# Patient Record
Sex: FEMALE | Race: BLACK OR AFRICAN AMERICAN | NOT HISPANIC OR LATINO | Employment: OTHER | URBAN - METROPOLITAN AREA
[De-identification: names, ages, dates, MRNs, and addresses within clinical notes are randomized per-mention and may not be internally consistent; named-entity substitution may affect disease eponyms.]

---

## 2019-12-02 ENCOUNTER — OFFICE VISIT (OUTPATIENT)
Dept: UROLOGY | Facility: CLINIC | Age: 69
End: 2019-12-02
Payer: MEDICARE

## 2019-12-02 VITALS
HEIGHT: 63 IN | BODY MASS INDEX: 37.21 KG/M2 | SYSTOLIC BLOOD PRESSURE: 137 MMHG | RESPIRATION RATE: 17 BRPM | HEART RATE: 46 BPM | WEIGHT: 210 LBS | DIASTOLIC BLOOD PRESSURE: 65 MMHG

## 2019-12-02 DIAGNOSIS — R39.13 INTERMITTENT URINARY STREAM: ICD-10-CM

## 2019-12-02 DIAGNOSIS — N39.41 URGE URINARY INCONTINENCE: Primary | ICD-10-CM

## 2019-12-02 LAB
BILIRUB UR QL STRIP: NEGATIVE
GLUCOSE UR QL STRIP: NEGATIVE
KETONES UR QL STRIP: NEGATIVE
LEUKOCYTE ESTERASE UR QL STRIP: NEGATIVE
PH, POC UA: 7
POC AMORP, URINE: ABNORMAL
POC BACTI, URINE: ABNORMAL
POC BLOOD, URINE: POSITIVE
POC CASTS, URINE: ABNORMAL
POC CRYST, URINE: ABNORMAL
POC EPITH, URINE: ABNORMAL
POC HCG, URINE: ABNORMAL
POC HYALIN, URINE: 0 LPF
POC MUCUS, URINE: ABNORMAL
POC NITRATES, URINE: NEGATIVE
POC OTHER, URINE: ABNORMAL
POC RBC, URINE: 0 HPF
POC WBC, URINE: 0 HPF
PROT UR QL STRIP: NEGATIVE
SP GR UR STRIP: 1.01 (ref 1–1.03)
UROBILINOGEN UR STRIP-ACNC: 1 (ref 0.1–1.1)

## 2019-12-02 PROCEDURE — 1159F PR MEDICATION LIST DOCUMENTED IN MEDICAL RECORD: ICD-10-PCS | Mod: S$GLB,,, | Performed by: NURSE PRACTITIONER

## 2019-12-02 PROCEDURE — 1126F PR PAIN SEVERITY QUANTIFIED, NO PAIN PRESENT: ICD-10-PCS | Mod: S$GLB,,, | Performed by: NURSE PRACTITIONER

## 2019-12-02 PROCEDURE — 81001 PR  URINALYSIS, AUTO, W/SCOPE: ICD-10-PCS | Mod: S$GLB,,, | Performed by: NURSE PRACTITIONER

## 2019-12-02 PROCEDURE — 81001 URINALYSIS AUTO W/SCOPE: CPT | Mod: S$GLB,,, | Performed by: NURSE PRACTITIONER

## 2019-12-02 PROCEDURE — 99214 OFFICE O/P EST MOD 30 MIN: CPT | Mod: 25,S$GLB,, | Performed by: NURSE PRACTITIONER

## 2019-12-02 PROCEDURE — 1159F MED LIST DOCD IN RCRD: CPT | Mod: S$GLB,,, | Performed by: NURSE PRACTITIONER

## 2019-12-02 PROCEDURE — 99214 PR OFFICE/OUTPT VISIT, EST, LEVL IV, 30-39 MIN: ICD-10-PCS | Mod: 25,S$GLB,, | Performed by: NURSE PRACTITIONER

## 2019-12-02 PROCEDURE — 1126F AMNT PAIN NOTED NONE PRSNT: CPT | Mod: S$GLB,,, | Performed by: NURSE PRACTITIONER

## 2019-12-02 RX ORDER — PRAVASTATIN SODIUM 40 MG/1
40 TABLET ORAL NIGHTLY
Refills: 3 | COMMUNITY
Start: 2019-11-22

## 2019-12-02 RX ORDER — IMIPRAMINE HYDROCHLORIDE 25 MG/1
TABLET, FILM COATED ORAL
COMMUNITY

## 2019-12-02 RX ORDER — ACETAMINOPHEN 500 MG
TABLET ORAL
COMMUNITY

## 2019-12-02 RX ORDER — TOPIRAMATE 100 MG/1
100 TABLET, FILM COATED ORAL 2 TIMES DAILY
Refills: 3 | COMMUNITY
Start: 2019-11-02

## 2019-12-02 RX ORDER — LOSARTAN POTASSIUM 50 MG/1
50 TABLET ORAL DAILY
Refills: 2 | COMMUNITY
Start: 2019-09-16

## 2019-12-02 RX ORDER — FLUTICASONE PROPIONATE 50 MCG
1 SPRAY, SUSPENSION (ML) NASAL DAILY
COMMUNITY

## 2019-12-02 RX ORDER — LORATADINE 10 MG/1
TABLET ORAL
COMMUNITY

## 2019-12-02 RX ORDER — NAPROXEN SODIUM 220 MG/1
TABLET, FILM COATED ORAL
COMMUNITY

## 2019-12-02 RX ORDER — OMEPRAZOLE 20 MG/1
TABLET, ORALLY DISINTEGRATING, DELAYED RELEASE ORAL
COMMUNITY

## 2019-12-02 RX ORDER — VITAMIN E (DL,TOCOPHERYL ACET) 45 MG/0.25
DROPS ORAL
COMMUNITY

## 2019-12-02 NOTE — PROGRESS NOTES
Chief Complaint: yearly f/u incontinence      HPI:  69-year-old  female known to Dr. Mcnair who presents for yearly follow up incontinence.  Patient has urgency incontinence known as driveway/latchkey urinary incontinence.  She feels the certain urge to go to the bathroom issues she pulls up in the driveway.  She does not like to use public bathrooms and by the time she gets to her driveway her bladder is really full.  With a stretched bladder wall we were not able to get any control with all the anticholinergics that we tried including Myrbetriq and VESIcare.  Patient knows that she holds her urine for long periods of time and has been attempting to go to the restroom more frequently.  She uses 2 pads daily.  She states that she takes her medications late at night so she does have to wake up to urinate shortly after.  She understands the behavior modifications to alleviate her symptoms.    At times she has intermittent urinary stream, she remains on the toilet after she urinates and then is able to get the rest of the urine out after a short period of time.     She reports that her symptoms are not worsening, denies any gross hematuria or dysuria.  She denies any new urological complaints.    Patient reports that she has had cystoscopy in the past.    Allergies:  Patient has no known allergies.    Medications: has a current medication list which includes the following prescription(s): fluticasone propionate, aspirin, calcium carb and citrate-vitd3, imipramine, krill-om-3-dha-epa-phospho-ast, lactobac no.41/bifidobact no.7, loratadine, losartan, omeprazole, pravastatin, and topiramate.    Review of Systems:  General: No fever, chills, vision changes, dizziness, weakness, fatigue, unexplained weight loss, confusion, or mood swings.  Skin: No rashes, itching, or changes in color/texture of skin.  Chest: Denies STEWART, cyanosis, wheezing, cough, and sputum production.  Abdomen: Appetite fine. Denies  diarrhea, abdominal pain, hematemesis, or blood in stool.  Musculoskeletal: No joint stiffness or swelling. Denies back pain.  : As above.  All other review of systems negative.    PMH:   has a past medical history of Arthritis, Epilepsy, Hypercholesterolemia, Hypertension, and Incontinence in female.    PSH:   has a past surgical history that includes Hysterectomy; Foot surgery; Eye surgery; Total replacement of both knees using computer-assisted navigation; and Tonsillectomy.    FamHx: family history includes Breast cancer in her sister; Lung cancer in her brother and mother; Prostate cancer in her father.    SocHx:  reports that she has never smoked. She has never used smokeless tobacco. She reports that she does not drink alcohol or use drugs.      Physical Exam:  Vitals:    12/02/19 0930   BP: 137/65   Pulse: (!) 46   Resp: 17     General: AAOx3, no apparent distress, no deformities  Neck: supple, no masses, normal thyroid  Lungs: normal inspiration, no use of accessory muscles  Heart: regular rate and rhythm, no arrhythmias  Abdomen: soft, NT, ND, no masses, no hernias, no hepatosplenomegaly  Lymphatic: no unusually enlarged or tender lymph nodes  Skin: warm and dry, no jaundice.  Ext: without edema or deformity.  : deferred    Labs/Studies: UA negative for infection or hematuria    Impression/Plan:   Urge urinary incontinence  Comments:  due to holding urine for long periods of time and increased fluid intake, has tried multiple meds in the past without relief of symptoms, continue to monitor  Orders:  -     POCT Urinalysis (w/Micro Option)    Intermittent urinary stream  Comments:  continue double voiding, ensure timely urination        Follow up in about 1 year (around 12/2/2020).

## 2022-01-18 ENCOUNTER — OUTSIDE PLACE OF SERVICE (OUTPATIENT)
Dept: GASTROENTEROLOGY | Facility: CLINIC | Age: 72
End: 2022-01-18
Payer: MEDICARE

## 2022-01-18 ENCOUNTER — OUTSIDE PLACE OF SERVICE (OUTPATIENT)
Dept: GASTROENTEROLOGY | Facility: CLINIC | Age: 72
End: 2022-01-18

## 2022-01-18 LAB — CRC RECOMMENDATION EXT: NORMAL

## 2022-01-18 PROCEDURE — 45385 COLONOSCOPY W/LESION REMOVAL: CPT | Mod: PT,,, | Performed by: INTERNAL MEDICINE

## 2022-01-18 PROCEDURE — 43239 PR EGD, FLEX, W/BIOPSY, SGL/MULTI: ICD-10-PCS | Mod: 51,,, | Performed by: INTERNAL MEDICINE

## 2022-01-18 PROCEDURE — 43239 EGD BIOPSY SINGLE/MULTIPLE: CPT | Mod: 51,,, | Performed by: INTERNAL MEDICINE

## 2022-01-18 PROCEDURE — 45385 PR COLONOSCOPY,REMV LESN,SNARE: ICD-10-PCS | Mod: PT,,, | Performed by: INTERNAL MEDICINE

## 2022-01-27 NOTE — PROGRESS NOTES
GBx chr inact w/o Hp, GEBx wnl, 4 polyps: 2 TA, repeat colon w/adult colonoscope in 2y.  CMA to notify patient. She has switched back to ome as it worked better for her than the panto. Let me know if any issues.  NBP

## 2022-02-02 ENCOUNTER — TELEPHONE (OUTPATIENT)
Dept: GASTROENTEROLOGY | Facility: CLINIC | Age: 72
End: 2022-02-02
Payer: MEDICARE

## 2022-02-02 NOTE — TELEPHONE ENCOUNTER
----- Message from Cassandra Marcano MD sent at 1/27/2022  5:37 PM CST -----  GBx chr inact w/o Hp, GEBx wnl, 4 polyps: 2 TA, repeat colon w/adult colonoscope in 2y.  CMA to notify patient. She has switched back to ome as it worked better for her than the panto. Let me know if any issues.  NBP

## 2022-03-24 ENCOUNTER — OFFICE VISIT (OUTPATIENT)
Dept: UROLOGY | Facility: CLINIC | Age: 72
End: 2022-03-24
Payer: MEDICARE

## 2022-03-24 VITALS
HEART RATE: 50 BPM | DIASTOLIC BLOOD PRESSURE: 77 MMHG | SYSTOLIC BLOOD PRESSURE: 164 MMHG | HEIGHT: 63 IN | BODY MASS INDEX: 37.21 KG/M2 | WEIGHT: 210 LBS

## 2022-03-24 DIAGNOSIS — N28.1 RENAL CYST: Primary | ICD-10-CM

## 2022-03-24 DIAGNOSIS — N39.41 URGE URINARY INCONTINENCE: ICD-10-CM

## 2022-03-24 PROCEDURE — 99214 PR OFFICE/OUTPT VISIT, EST, LEVL IV, 30-39 MIN: ICD-10-PCS | Mod: S$GLB,,, | Performed by: UROLOGY

## 2022-03-24 PROCEDURE — 99214 OFFICE O/P EST MOD 30 MIN: CPT | Mod: S$GLB,,, | Performed by: UROLOGY

## 2022-03-24 RX ORDER — LOSARTAN POTASSIUM 100 MG/1
100 TABLET ORAL DAILY
COMMUNITY
Start: 2022-03-18

## 2022-03-24 RX ORDER — OMEPRAZOLE 40 MG/1
40 CAPSULE, DELAYED RELEASE ORAL DAILY
COMMUNITY
Start: 2022-01-26

## 2022-03-24 RX ORDER — TOPIRAMATE 100 MG/1
TABLET, FILM COATED ORAL
COMMUNITY

## 2022-03-24 RX ORDER — PRAVASTATIN SODIUM 40 MG/1
TABLET ORAL
COMMUNITY

## 2022-03-24 NOTE — PROGRESS NOTES
Subjective:       Patient ID: Daisy Melendez is a 72 y.o. female.    Chief Complaint: Urinary Incontinence      HPI:  72-year-old female with a history of urinary incontinence she has tried some anticholinergics which helped initially but then stopped working she has tried Myrbetriq which worked however it is too expensive in her insurance will not cover it.  Overall she is not significantly bothered as her symptoms are not constant.  She has questions about a renal lesion that was diagnosed years ago without follow-up    Past Medical History:   Past Medical History:   Diagnosis Date    Arthritis     Epilepsy     Hypercholesterolemia     Hypertension     Incontinence in female        Past Surgical Historical:   Past Surgical History:   Procedure Laterality Date    EYE SURGERY      FOOT SURGERY      HYSTERECTOMY      TONSILLECTOMY      TOTAL REPLACEMENT OF BOTH KNEES USING COMPUTER-ASSISTED NAVIGATION          Medications:   Medication List with Changes/Refills   Current Medications    ASPIRIN 81 MG CHEW    Aspir-81    CALCIUM CARB AND CITRATE-VITD3 (CITRACAL + D SLOW RELEASE) 600 MG CALCIUM- 500 UNIT TBSR    Citracal + D Slow Release 600 mg calcium-500 unit tablet,ext.release   Take by oral route.    FLUTICASONE PROPIONATE (FLONASE) 50 MCG/ACTUATION NASAL SPRAY    1 spray by Each Nostril route once daily.    IMIPRAMINE (TOFRANIL) 25 MG TABLET    imipramine 25 mg tablet    KRILL-OM-3-DHA-EPA-PHOSPHO-AST (MEGARED OMEGA-3 KRILL OIL) [id number] MG CAP    MegaRed Omega-3 Krill Oil 500 mg-115 mg-30 mg-64 mg capsule   Take by oral route.    LACTOBAC NO.41/BIFIDOBACT NO.7 (PROBIOTIC-10 ORAL)    Probiotic    LORATADINE (CLARITIN) 10 MG TABLET    Claritin    LOSARTAN (COZAAR) 100 MG TABLET    Take 100 mg by mouth once daily.    LOSARTAN (COZAAR) 50 MG TABLET    Take 50 mg by mouth once daily.    OMEPRAZOLE (PRILOSEC) 40 MG CAPSULE    Take 40 mg by mouth once daily.    OMEPRAZOLE 20 MG TBLD    omeprazole 20 mg  capsule,delayed release    PRAVASTATIN (PRAVACHOL) 40 MG TABLET    Take 40 mg by mouth every evening.    PRAVASTATIN (PRAVACHOL) 40 MG TABLET    pravastatin 40 mg tablet   Take 1 tablet every day by oral route.    TOPIRAMATE (TOPAMAX) 100 MG TABLET    Take 100 mg by mouth 2 (two) times daily.    TOPIRAMATE (TOPAMAX) 100 MG TABLET    Topamax 100 mg tablet   Take 1 tablet twice a day by oral route.        Past Social History:   Social History     Socioeconomic History    Marital status:    Tobacco Use    Smoking status: Never Smoker    Smokeless tobacco: Never Used   Substance and Sexual Activity    Alcohol use: Never    Drug use: Never       Allergies: Review of patient's allergies indicates:  No Known Allergies     Family History:   Family History   Problem Relation Age of Onset    Prostate cancer Father     Lung cancer Mother     Lung cancer Brother     Breast cancer Sister         Review of Systems:  Review of Systems   Constitutional: Negative for activity change and appetite change.   HENT: Negative for congestion and dental problem.    Respiratory: Negative for chest tightness and shortness of breath.    Cardiovascular: Negative for chest pain.   Gastrointestinal: Negative for abdominal distention and abdominal pain.   Genitourinary: Negative for decreased urine volume, difficulty urinating, dyspareunia, dysuria, enuresis, flank pain, frequency, genital sores, hematuria, pelvic pain and urgency.   Musculoskeletal: Negative for back pain and neck pain.   Allergic/Immunologic: Negative for immunocompromised state.   Neurological: Negative for dizziness.   Hematological: Negative for adenopathy.   Psychiatric/Behavioral: Negative for agitation, behavioral problems and confusion.       Physical Exam:  Physical Exam  Constitutional:       Appearance: She is well-developed.   HENT:      Head: Normocephalic and atraumatic.      Right Ear: External ear normal.      Left Ear: External ear normal.   Eyes:       Conjunctiva/sclera: Conjunctivae normal.   Neck:      Vascular: No JVD.   Cardiovascular:      Rate and Rhythm: Normal rate and regular rhythm.   Pulmonary:      Effort: Pulmonary effort is normal. No respiratory distress.      Breath sounds: Normal breath sounds. No wheezing.   Abdominal:      General: There is no distension.      Palpations: Abdomen is soft.      Tenderness: There is no abdominal tenderness. There is no rebound.   Musculoskeletal:         General: Normal range of motion.      Cervical back: Normal range of motion.   Skin:     General: Skin is warm and dry.      Findings: No erythema or rash.   Neurological:      Mental Status: She is alert and oriented to person, place, and time.   Psychiatric:         Behavior: Behavior normal.         Assessment/Plan:       Problem List Items Addressed This Visit    None            Urinary urge incontinence:  I have offered to attempt to try Motegrity again however the patient declines at this time.  Patient will call should her symptoms worsen.    History of renal lesion:  We will order renal ultrasound to screen for any renal abnormalities

## 2022-04-11 ENCOUNTER — TELEPHONE (OUTPATIENT)
Dept: UROLOGY | Facility: CLINIC | Age: 72
End: 2022-04-11
Payer: MEDICARE

## 2022-04-11 DIAGNOSIS — N28.1 RENAL CYST: Primary | ICD-10-CM

## 2022-04-11 NOTE — TELEPHONE ENCOUNTER
----- Message from Polo Antonio MD sent at 4/11/2022  8:28 AM CDT -----  Please inform pt of results

## 2022-07-07 ENCOUNTER — OFFICE VISIT (OUTPATIENT)
Dept: UROLOGY | Facility: CLINIC | Age: 72
End: 2022-07-07
Payer: MEDICARE

## 2022-07-07 VITALS
BODY MASS INDEX: 38.98 KG/M2 | SYSTOLIC BLOOD PRESSURE: 149 MMHG | HEART RATE: 50 BPM | HEIGHT: 63 IN | WEIGHT: 220 LBS | DIASTOLIC BLOOD PRESSURE: 74 MMHG

## 2022-07-07 DIAGNOSIS — R39.198 DIFFICULTY VOIDING: Primary | ICD-10-CM

## 2022-07-07 LAB — POC RESIDUAL URINE VOLUME: 0 ML (ref 0–100)

## 2022-07-07 PROCEDURE — 51798 POCT BLADDER SCAN: ICD-10-PCS | Mod: S$GLB,,, | Performed by: NURSE PRACTITIONER

## 2022-07-07 PROCEDURE — 99213 OFFICE O/P EST LOW 20 MIN: CPT | Mod: S$GLB,,, | Performed by: NURSE PRACTITIONER

## 2022-07-07 PROCEDURE — 99213 PR OFFICE/OUTPT VISIT, EST, LEVL III, 20-29 MIN: ICD-10-PCS | Mod: S$GLB,,, | Performed by: NURSE PRACTITIONER

## 2022-07-07 PROCEDURE — 51798 US URINE CAPACITY MEASURE: CPT | Mod: S$GLB,,, | Performed by: NURSE PRACTITIONER

## 2022-07-07 NOTE — PROGRESS NOTES
Subjective:       Patient ID: Daisy Melendez is a 72 y.o. female.    Chief Complaint: Other (Covid in may, flow has changed, difficulty with flow)      HPI: 72-year-old female, established patient.  Patient has history of frequency and urgency with leakage.  She has failed oxybutynin and VESIcare.  She has also failed Myrbetriq.  Myrbetriq was also too expensive.    Patient states she had COVID in May.  Patient states her frequency and urgency has improved.  Her leakage has also improved.  However, she feels like she does not empty her bladder.    She will void and then feel like she still has a left put out.    She denies any pain or burning urination.  Denies any odor.  Denies any fever body.  Denies any blood in urine.  No other urinary complaints at this time.       Past Medical History:   Past Medical History:   Diagnosis Date    Arthritis     COVID 05/2022    Epilepsy     Hypercholesterolemia     Hypertension     Incontinence in female        Past Surgical Historical:   Past Surgical History:   Procedure Laterality Date    EYE SURGERY      FOOT SURGERY      HYSTERECTOMY      TONSILLECTOMY      TOTAL REPLACEMENT OF BOTH KNEES USING COMPUTER-ASSISTED NAVIGATION          Medications:   Medication List with Changes/Refills   Current Medications    ASPIRIN 81 MG CHEW    Aspir-81    CALCIUM CARB AND CITRATE-VITD3 (CITRACAL + D SLOW RELEASE) 600 MG CALCIUM- 500 UNIT TBSR    Citracal + D Slow Release 600 mg calcium-500 unit tablet,ext.release   Take by oral route.    FLUTICASONE PROPIONATE (FLONASE) 50 MCG/ACTUATION NASAL SPRAY    1 spray by Each Nostril route once daily.    IMIPRAMINE (TOFRANIL) 25 MG TABLET    imipramine 25 mg tablet    KRILL-OM-3-DHA-EPA-PHOSPHO-AST (MEGARED OMEGA-3 KRILL OIL) [id number] MG CAP    MegaRed Omega-3 Krill Oil 500 mg-115 mg-30 mg-64 mg capsule   Take by oral route.    LACTOBAC NO.41/BIFIDOBACT NO.7 (PROBIOTIC-10 ORAL)    Probiotic    LORATADINE (CLARITIN) 10 MG TABLET     Claritin    LOSARTAN (COZAAR) 100 MG TABLET    Take 100 mg by mouth once daily.    LOSARTAN (COZAAR) 50 MG TABLET    Take 50 mg by mouth once daily.    OMEPRAZOLE (PRILOSEC) 40 MG CAPSULE    Take 40 mg by mouth once daily.    OMEPRAZOLE 20 MG TBLD    omeprazole 20 mg capsule,delayed release    PRAVASTATIN (PRAVACHOL) 40 MG TABLET    Take 40 mg by mouth every evening.    PRAVASTATIN (PRAVACHOL) 40 MG TABLET    pravastatin 40 mg tablet   Take 1 tablet every day by oral route.    TOPIRAMATE (TOPAMAX) 100 MG TABLET    Take 100 mg by mouth 2 (two) times daily.    TOPIRAMATE (TOPAMAX) 100 MG TABLET    Topamax 100 mg tablet   Take 1 tablet twice a day by oral route.        Past Social History:   Social History     Socioeconomic History    Marital status:    Tobacco Use    Smoking status: Never Smoker    Smokeless tobacco: Never Used   Substance and Sexual Activity    Alcohol use: Never    Drug use: Never       Allergies: Review of patient's allergies indicates:  No Known Allergies     Family History:   Family History   Problem Relation Age of Onset    Prostate cancer Father     Lung cancer Mother     Lung cancer Brother     Breast cancer Sister         Review of Systems:  Review of Systems   Constitutional: Negative for activity change and appetite change.   HENT: Negative for congestion and dental problem.    Respiratory: Negative for chest tightness and shortness of breath.    Cardiovascular: Negative for chest pain.   Gastrointestinal: Negative for abdominal distention.   Genitourinary: Positive for difficulty urinating. Negative for decreased urine volume, dyspareunia, dysuria, enuresis, flank pain, frequency, genital sores, hematuria, pelvic pain and urgency.   Musculoskeletal: Negative for back pain and neck pain.   Allergic/Immunologic: Negative for immunocompromised state.   Neurological: Negative for dizziness.   Hematological: Negative for adenopathy.   Psychiatric/Behavioral: Negative for  agitation, behavioral problems and confusion.       Physical Exam:  Physical Exam  Vitals and nursing note reviewed.   Constitutional:       Appearance: She is well-developed.   HENT:      Head: Normocephalic.   Cardiovascular:      Rate and Rhythm: Normal rate and regular rhythm.      Heart sounds: Normal heart sounds.   Pulmonary:      Effort: Pulmonary effort is normal.      Breath sounds: Normal breath sounds.   Abdominal:      General: Bowel sounds are normal.      Palpations: Abdomen is soft.   Skin:     General: Skin is warm and dry.   Neurological:      Mental Status: She is alert and oriented to person, place, and time.       Urinalysis:  Normal  Bladder scan:  0 cc    Assessment/Plan:   Difficulty voiding:  Patient has frequency urgency and leakage is improved since her COVID.  However, she still has some difficulty emptying her bladder.  We discussed double voiding.  UA and bladder scan are both normal.    Patient will notify us if symptoms become worse.  Follow-up as scheduled.  Problem List Items Addressed This Visit    None     Visit Diagnoses     Difficulty voiding    -  Primary

## 2023-01-11 ENCOUNTER — DOCUMENTATION ONLY (OUTPATIENT)
Dept: GASTROENTEROLOGY | Facility: CLINIC | Age: 73
End: 2023-01-11
Payer: MEDICARE

## 2023-05-30 ENCOUNTER — OFFICE VISIT (OUTPATIENT)
Dept: UROLOGY | Facility: CLINIC | Age: 73
End: 2023-05-30
Payer: MEDICARE

## 2023-05-30 VITALS
HEIGHT: 63 IN | WEIGHT: 222 LBS | BODY MASS INDEX: 39.34 KG/M2 | SYSTOLIC BLOOD PRESSURE: 139 MMHG | HEART RATE: 52 BPM | DIASTOLIC BLOOD PRESSURE: 67 MMHG

## 2023-05-30 DIAGNOSIS — N28.1 RENAL CYST: Primary | ICD-10-CM

## 2023-05-30 DIAGNOSIS — N39.41 URGE URINARY INCONTINENCE: ICD-10-CM

## 2023-05-30 PROCEDURE — 99214 OFFICE O/P EST MOD 30 MIN: CPT | Mod: S$GLB,,, | Performed by: UROLOGY

## 2023-05-30 PROCEDURE — 99214 PR OFFICE/OUTPT VISIT, EST, LEVL IV, 30-39 MIN: ICD-10-PCS | Mod: S$GLB,,, | Performed by: UROLOGY

## 2023-05-30 NOTE — PROGRESS NOTES
Subjective:       Patient ID: Daisy Melendez is a 73 y.o. female.    Chief Complaint: renal cyst      HPI: 73-year-old female, established patient.  Following up for imaging of her renal cyst.  Most recent imaging is within normal limits and does not note any solid or cystic lesions.    Patient has history of frequency and urgency with leakage.  She has failed oxybutynin and VESIcare. She has also failed Myrbetriq.  Myrbetriq was also too expensive.       Past Medical History:   Past Medical History:   Diagnosis Date    Arthritis     COVID 05/2022    Epilepsy     Hypercholesterolemia     Hypertension     Incontinence in female        Past Surgical Historical:   Past Surgical History:   Procedure Laterality Date    EYE SURGERY      FOOT SURGERY      HYSTERECTOMY      TONSILLECTOMY      TOTAL REPLACEMENT OF BOTH KNEES USING COMPUTER-ASSISTED NAVIGATION          Medications:   Medication List with Changes/Refills   Current Medications    ASPIRIN 81 MG CHEW    Aspir-81    CALCIUM CARB AND CITRATE-VITD3 (CITRACAL + D SLOW RELEASE) 600 MG CALCIUM- 500 UNIT TBSR    Citracal + D Slow Release 600 mg calcium-500 unit tablet,ext.release   Take by oral route.    FLUTICASONE PROPIONATE (FLONASE) 50 MCG/ACTUATION NASAL SPRAY    1 spray by Each Nostril route once daily.    IMIPRAMINE (TOFRANIL) 25 MG TABLET    imipramine 25 mg tablet    KRILL-OM-3-DHA-EPA-PHOSPHO-AST (MEGARED OMEGA-3 KRILL OIL) [id number] MG CAP    MegaRed Omega-3 Krill Oil 500 mg-115 mg-30 mg-64 mg capsule   Take by oral route.    LACTOBAC NO.41/BIFIDOBACT NO.7 (PROBIOTIC-10 ORAL)    Probiotic    LORATADINE (CLARITIN) 10 MG TABLET    Claritin    LOSARTAN (COZAAR) 100 MG TABLET    Take 100 mg by mouth once daily.    LOSARTAN (COZAAR) 50 MG TABLET    Take 50 mg by mouth once daily.    OMEPRAZOLE (PRILOSEC) 40 MG CAPSULE    Take 40 mg by mouth once daily.    OMEPRAZOLE 20 MG TBLD    omeprazole 20 mg capsule,delayed release    PRAVASTATIN (PRAVACHOL) 40 MG TABLET     Take 40 mg by mouth every evening.    PRAVASTATIN (PRAVACHOL) 40 MG TABLET    pravastatin 40 mg tablet   Take 1 tablet every day by oral route.    TOPIRAMATE (TOPAMAX) 100 MG TABLET    Take 100 mg by mouth 2 (two) times daily.    TOPIRAMATE (TOPAMAX) 100 MG TABLET    Topamax 100 mg tablet   Take 1 tablet twice a day by oral route.        Past Social History:   Social History     Socioeconomic History    Marital status:    Tobacco Use    Smoking status: Never    Smokeless tobacco: Never   Substance and Sexual Activity    Alcohol use: Never    Drug use: Never       Allergies: Review of patient's allergies indicates:  No Known Allergies     Family History:   Family History   Problem Relation Age of Onset    Prostate cancer Father     Lung cancer Mother     Lung cancer Brother     Breast cancer Sister         Review of Systems:  Review of Systems   Constitutional:  Negative for activity change, appetite change, chills, diaphoresis, fatigue, fever and unexpected weight change.   HENT:  Negative for congestion, dental problem, drooling, ear discharge, ear pain, facial swelling, hearing loss, mouth sores, nosebleeds, postnasal drip, rhinorrhea, sinus pressure, sinus pain, sneezing, sore throat, tinnitus, trouble swallowing and voice change.    Eyes:  Negative for photophobia, pain, discharge, redness, itching and visual disturbance.   Respiratory:  Negative for apnea, cough, choking, chest tightness, shortness of breath, wheezing and stridor.    Cardiovascular:  Negative for chest pain and leg swelling.   Gastrointestinal:  Negative for abdominal distention, abdominal pain, anal bleeding, blood in stool, constipation, diarrhea, nausea, rectal pain and vomiting.   Endocrine: Negative for cold intolerance, heat intolerance, polydipsia, polyphagia and polyuria.   Genitourinary: Negative.  Negative for decreased urine volume, difficulty urinating, dyspareunia, dysuria, enuresis, flank pain, frequency, genital sores,  hematuria, menstrual problem, pelvic pain, urgency, vaginal bleeding, vaginal discharge and vaginal pain.   Musculoskeletal:  Negative for arthralgias, back pain, gait problem, joint swelling, myalgias, neck pain and neck stiffness.   Skin:  Negative for color change, pallor, rash and wound.   Allergic/Immunologic: Negative for environmental allergies, food allergies and immunocompromised state.   Neurological:  Negative for dizziness, tremors, seizures, syncope, facial asymmetry, speech difficulty, weakness, light-headedness, numbness and headaches.   Hematological:  Negative for adenopathy. Does not bruise/bleed easily.   Psychiatric/Behavioral:  Negative for agitation, behavioral problems, confusion, decreased concentration, dysphoric mood, hallucinations, self-injury, sleep disturbance and suicidal ideas. The patient is not nervous/anxious and is not hyperactive.      Physical Exam:  Physical Exam  Exam conducted with a chaperone present.   Constitutional:       Appearance: Normal appearance.   HENT:      Head: Normocephalic.      Nose: Nose normal.      Mouth/Throat:      Mouth: Mucous membranes are moist.      Pharynx: Oropharynx is clear.   Eyes:      Extraocular Movements: Extraocular movements intact.      Conjunctiva/sclera: Conjunctivae normal.      Pupils: Pupils are equal, round, and reactive to light.   Cardiovascular:      Rate and Rhythm: Normal rate and regular rhythm.      Pulses: Normal pulses.      Heart sounds: Normal heart sounds.   Pulmonary:      Effort: Pulmonary effort is normal.      Breath sounds: Normal breath sounds.   Abdominal:      General: Abdomen is flat. Bowel sounds are normal.      Palpations: Abdomen is soft.      Hernia: There is no hernia in the left inguinal area or right inguinal area.   Genitourinary:     General: Normal vulva.      Pubic Area: No rash or pubic lice.       Labia:         Right: No rash, tenderness, lesion or injury.         Left: No rash, tenderness,  lesion or injury.       Urethra: No prolapse, urethral pain, urethral swelling or urethral lesion.      Rectum: Normal.   Musculoskeletal:         General: Normal range of motion.      Cervical back: Normal range of motion and neck supple.   Lymphadenopathy:      Lower Body: No right inguinal adenopathy. No left inguinal adenopathy.   Skin:     General: Skin is warm and dry.      Capillary Refill: Capillary refill takes less than 2 seconds.   Neurological:      General: No focal deficit present.      Mental Status: She is alert and oriented to person, place, and time. Mental status is at baseline.   Psychiatric:         Mood and Affect: Mood normal.         Behavior: Behavior normal.         Thought Content: Thought content normal.         Judgment: Judgment normal.       Assessment/Plan:       Renal cyst:  No further workup needed at this time.  Most recent imaging does not note any solid or cystic lesions    Overactive bladder:  Patient complains of frequency but she would like to avoid any medications at this time.  Follow up as needed.  She denies any dysuria, fever, flank pain    I, Dr. Polo Antonio have seen and personally evaluated the patient. I have formulated the plan reviewed all pertinent imaging and clinical data.  I agree with the nurse practitioner's assessment, and I have personally formulated the plan for this patient's care as described by the midlevel.  Problem List Items Addressed This Visit    None

## 2024-11-04 ENCOUNTER — TELEPHONE (OUTPATIENT)
Dept: GASTROENTEROLOGY | Facility: CLINIC | Age: 74
End: 2024-11-04
Payer: MEDICARE

## 2024-11-04 ENCOUNTER — TELEPHONE (OUTPATIENT)
Facility: CLINIC | Age: 74
End: 2024-11-04
Payer: MEDICARE

## 2024-11-04 ENCOUNTER — NURSE TRIAGE (OUTPATIENT)
Dept: ADMINISTRATIVE | Facility: CLINIC | Age: 74
End: 2024-11-04
Payer: MEDICARE

## 2024-11-04 ENCOUNTER — TELEPHONE (OUTPATIENT)
Dept: GASTROENTEROLOGY | Facility: CLINIC | Age: 74
End: 2024-11-04

## 2024-11-04 NOTE — TELEPHONE ENCOUNTER
Pt reports constipation that began about 1 week ago. Pt reports using Dulcolax on Thursday and had BM Friday morning. Pt reports now she has rectal fullness. Last BM today and described as small, hard, tyron. Pt reports intermittent abdominal pain after eating and lower back pain. Pt reports she takes daily stool softeners. Pt also reports this morning after having BM she noticed blood on toilet paper after wiping. Care advice to be seen in office today. Pt requesting to be seen by GI. Unable to schedule appointment. Secure chat sent to Moira Palomo MD verbalized pt should be seen. LPN sent secure chat to GI team and MA from office verbalized she is going to call pt now. Pt made aware and verbalizes understanding.   Reason for Disposition   Abdomen is more swollen than usual    Additional Information   Negative: Vomiting bile (green color)   Negative: Patient sounds very sick or weak to the triager   Negative: Constant abdominal pain lasting > 2 hours   Negative: Vomiting and abdomen looks much more swollen than usual   Negative: Rectal pain or fullness from fecal impaction (rectum full of stool) and NOT better after SITZ bath, suppository or enema    Protocols used: Constipation-A-OH

## 2024-11-04 NOTE — TELEPHONE ENCOUNTER
"Patient stating she is having difficulty having a bowel movement also with lower back pain. Patient states she has tried dulcolax (2) last Thursday, had bowel movement. Patient states she is back to being "backed-up". Patient states she tried to have bowel movement this morning, however, only small "pebble-like" bowel would come out and after she wiped there was a small amount of blood present.   Patient states 1 week ago, she was having right lower stomach pain. Patient went to see pcp, and pcp ordered US to check gallbladder, however, patient has not been contacted to have scheduled yet.   Patient states no nausea, no vomiting, no fever. Please advise.   "

## 2024-11-04 NOTE — TELEPHONE ENCOUNTER
"Spoke to the patient today for [facility] F/U call. Pt. stated unable to pass solid stool, 4/10 abdominal discomfort. Reports abdominal pain any time she eats or drinks. Pt stated her abdomen is hard and she feels very "full" and is uncomfortable. MA from GI spoke to her, but is unable to do anything without speaking to Dr. Marcano first and Dr. Marcano does procedures all day on Monday's so she could only forward message to Dr. Marcano. I instructed pt to go to the ED for further assessment as we were unable to get patient a same day appointment and Dr. Palomo would like patient to get a thorough in person assessment in case patient has a fecal impaction. Pt verbalized understanding and will go to ED.   "

## 2024-11-04 NOTE — TELEPHONE ENCOUNTER
[facility] Emergency Department Plan of Care Note    Referral source: Nurse On-Call      Reason for consult: Abdominal Pain:Pt reports constipation that began about 1 week ago. Pt reports using Dulcolax on Thursday and had BM Friday morning. Pt reports now she has rectal fullness. Last BM today and described as small, hard, tyron. Pt reports intermittent abdominal pain after eating and lower back pain. Pt reports she takes daily stool softeners. Pt also reports this morning after having BM she noticed blood on toilet paper after wiping. care advice to be seen in office today. pt requesting to see GI.       Disposition recommended: Emergency Department      Additional Recommendations:  It sounds like patient maybe did take a pain medication prior to the onset of these symptoms.  That said, given her age, report of rectal fullness, I would be worried about a fecal impaction.  Initial attempts to get an appointment for patient in the outpatient setting today were unsuccessful.  Advised patient that if she develops worsening abdominal pain, has persistent rectal fullness in his not able to pass a bowel movement, particularly if she has nausea or vomiting, she should actually get evaluated in the emergency department today.

## 2024-11-04 NOTE — TELEPHONE ENCOUNTER
Received message from call center stating that patient is requesting to be seen today due to stomach and bowel issues and blood when patient wipes. I tried to contact patient but there was no answer and no way of leaving message. BF

## 2024-11-05 ENCOUNTER — OUTSIDE PLACE OF SERVICE (OUTPATIENT)
Dept: SURGERY | Facility: CLINIC | Age: 74
End: 2024-11-05

## 2024-11-05 ENCOUNTER — TELEPHONE (OUTPATIENT)
Facility: CLINIC | Age: 74
End: 2024-11-05
Payer: MEDICARE

## 2024-11-05 ENCOUNTER — OUTSIDE PLACE OF SERVICE (OUTPATIENT)
Dept: SURGERY | Facility: CLINIC | Age: 74
End: 2024-11-05
Payer: MEDICARE

## 2024-11-05 NOTE — TELEPHONE ENCOUNTER
"[facility] follow up call made.  Patient spoke with [facility] RN on 11/4/2024 with complaint of: "Pt reports constipation that began about 1 week ago. Pt reports using Dulcolax on Thursday and had BM Friday morning. Pt reports now she has rectal fullness. Last BM today and described as small, hard, tyron. Pt reports intermittent abdominal pain after eating and lower back pain. Pt reports she takes daily stool softeners. Pt also reports this morning after having BM she noticed blood on toilet paper after wiping."    [facility] RN consulted with [facility] provider, Dr. Palomo and disposition recommended was for patient to go the emergency department due to unable to schedule same day gastroenterology appointment.  Patient spoke with MA from the GI clinic and informed patient they were unable to do anything without speaking to Dr. Marcano first and he does procedures all day on Mondays and a message would be forwarded to Dr. Marcano.  There was no emergency room encounter noted.    Patient outreached to see if received the care needed.  Navigator left a voicemail for call return.    "

## 2024-11-18 ENCOUNTER — OFFICE VISIT (OUTPATIENT)
Dept: SURGERY | Facility: CLINIC | Age: 74
End: 2024-11-18
Payer: MEDICARE

## 2024-11-18 DIAGNOSIS — K81.1 CHRONIC CHOLECYSTITIS: Primary | ICD-10-CM

## 2024-11-18 PROCEDURE — 99024 POSTOP FOLLOW-UP VISIT: CPT | Mod: POP,,, | Performed by: SURGERY

## 2024-11-18 NOTE — PROGRESS NOTES
Patient doing well in the postoperative period, not having any complaints of pain, nausea or vomiting, no diarrhea constipation.    Incisions are healing well, no tenderness or signs of infection.    Patient is doing well, instructed to refrain from any heavy lifting for 6 weeks from the time of surgery, patient will follow up my office on a p.r.n. basis.

## 2025-01-24 ENCOUNTER — TELEPHONE (OUTPATIENT)
Dept: GASTROENTEROLOGY | Facility: CLINIC | Age: 75
End: 2025-01-24
Payer: MEDICARE

## 2025-01-25 ENCOUNTER — TELEPHONE (OUTPATIENT)
Dept: GASTROENTEROLOGY | Facility: CLINIC | Age: 75
End: 2025-01-25
Payer: MEDICARE

## 2025-01-25 NOTE — TELEPHONE ENCOUNTER
----- Message from Radha sent at 1/21/2025 10:44 AM CST -----  Contact: self  [phone]  Type:  Needs Medical Advice    Who Called: Daisy  Symptoms (please be specific):appointment  How long has patient had these symptoms:    Pharmacy name and phone #:    Would the patient rather a call back or a response via [facility]? Call back   Best Call Back Number:  [phone]  Additional Information: patient called in needing to reschedule her appointment that is on tomorrow. Please call back  [phone]. Thanks tremaine

## 2025-01-25 NOTE — TELEPHONE ENCOUNTER
S/W pt & got her rescheduled from her 1/22/25 apt due to the bad weather. Pt is now scheduled for Feb 10th at 9AM with FRANCINE JUNE

## 2025-02-07 NOTE — PROGRESS NOTES
Clinic Note    Reason for visit:  The primary encounter diagnosis was Gastroesophageal reflux disease, unspecified whether esophagitis present. Diagnoses of Elevated LFTs, Diverticulosis, Severe obesity (BMI 35.0-39.9) with comorbidity, and History of colon polyps were also pertinent to this visit.    PCP: Tommie Porras       HPI:  This is a 75 y.o. female who is established. Patient with recent hospital visit due to abdominal pain. She states she had not had a BM in 3-4 days and was having abdominal discomfort. Abdominal pain was mainly to the right side and was concerned with GB. She was admitted due to hyperkalemia and had aria with Dr. Sheppard.  After aria she is having BM daily. May skip a day every once in a while. Has been watching her diet and eating high fiber diet. Denies blood in stool. Takes omeprazole 40mg daily which controls her reflux well. Denies any ETOH use, h/o liver disease.    H/o seizures. Last one waas 20 years ago. On topamax    12/12/2024 WBC 3.94L, Cr 0.94H AST 41H CBC, CMP-nl  6/17/2024 WBC 3.86L, Cr. 1.03H, GFR 56L CBC, CMP-nl  12/13/2023 AST 34H  12/16/2022 AST 34H    H/o hospital admission 11/4/2024 for abdominal pain  CT A/P 11/4/2024 cholelithiasis, small HH, diverticulosis, prominent retained feces (images reviewed: large retained stool RLQ)  RUQ US: 11/5/2024: cholelithiasis. Normal liver, bile duct, patent PV  11/4/2025 WBC 4.4L, K 5.7H, AST 85H CBC, CMP-nl  11/5/2025 WBC 3.0L, k 4.7, AST 45H, INR 1.1 CBC, CMP-nl  Aria 11/6/2025 path- chronic cholelithiasis.    EGD/Colonoscopy 1/18/2022 small HH GBx chr inact w/o Hp, GEBx wnl, 4 polyps: 2 TA, sigmoid diverticulosis, redundant colon repeat colon w/adult colonoscope in 2y       Review of Systems   Constitutional:  Negative for fatigue, fever and unexpected weight change.   HENT:  Negative for mouth sores, postnasal drip, sore throat and trouble swallowing.    Eyes:  Positive for discharge and eye dryness. Negative for pain.    Respiratory:  Negative for apnea, cough, choking, chest tightness, shortness of breath and wheezing.    Cardiovascular:  Negative for chest pain, palpitations and leg swelling.   Gastrointestinal:  Negative for abdominal distention, abdominal pain, anal bleeding, blood in stool, change in bowel habit, constipation, diarrhea, nausea, rectal pain, vomiting, reflux and fecal incontinence.   Genitourinary:  Positive for bladder incontinence. Negative for dysuria and hematuria.   Musculoskeletal:  Positive for back pain. Negative for arthralgias and joint swelling.   Integumentary:  Negative for color change and rash.   Allergic/Immunologic: Negative for environmental allergies and food allergies.   Neurological:  Negative for seizures and headaches.   Hematological:  Negative for adenopathy. Does not bruise/bleed easily.        Past Medical History:   Diagnosis Date    COVID 05/2022    Diverticulosis     Epilepsy     Hypercholesterolemia     Hypertension     Incontinence in female      Past Surgical History:   Procedure Laterality Date    CHOLECYSTECTOMY  11/05/2024    EYE SURGERY      FOOT SURGERY      HYSTERECTOMY      TONSILLECTOMY      TOTAL REPLACEMENT OF BOTH KNEES USING COMPUTER-ASSISTED NAVIGATION       Family History   Problem Relation Name Age of Onset    Lung cancer Mother      Prostate cancer Father      Breast cancer Sister      Lung cancer Brother      Stomach cancer Neg Hx      Rectal cancer Neg Hx      Liver cancer Neg Hx      Liver disease Neg Hx      Colon cancer Neg Hx      Celiac disease Neg Hx      Crohn's disease Neg Hx       Social History     Tobacco Use    Smoking status: Never    Smokeless tobacco: Never   Substance Use Topics    Alcohol use: Never    Drug use: Never     Review of patient's allergies indicates:  No Known Allergies   Medication List with Changes/Refills   New Medications    POLYETHYLENE GLYCOL (GOLYTELY) 236-22.74-6.74 -5.86 GRAM SUSPENSION    Take 4,000 mLs (4 L total) by  "mouth once. for 1 dose   Current Medications    ACETAMINOPHEN (TYLENOL) 500 MG TABLET    Take 500 mg by mouth every evening.    ASPIRIN 81 MG CHEW    Aspir-81    CALCIUM CARB AND CITRATE-VITD3 (CITRACAL + D SLOW RELEASE) 600 MG CALCIUM- 500 UNIT TBSR    Citracal + D Slow Release 600 mg calcium-500 unit tablet,ext.release   Take by oral route.    FLUTICASONE PROPIONATE (FLONASE) 50 MCG/ACTUATION NASAL SPRAY    1 spray by Each Nostril route once daily.    KRILL-OM-3-DHA-EPA-PHOSPHO-AST (MEGARED OMEGA-3 KRILL OIL) 978-256-06-64 MG CAP    MegaRed Omega-3 Krill Oil 500 mg-115 mg-30 mg-64 mg capsule   Take by oral route.    LORATADINE (CLARITIN) 10 MG TABLET    Claritin    LOSARTAN (COZAAR) 100 MG TABLET    Take 100 mg by mouth once daily.    MULTIVITAMIN (THERAGRAN) TABLET    Take 1 tablet by mouth once daily.    OMEPRAZOLE (PRILOSEC) 40 MG CAPSULE        PRAVASTATIN (PRAVACHOL) 40 MG TABLET    Take 40 mg by mouth every evening.    TOPIRAMATE (TOPAMAX) 100 MG TABLET    Take 100 mg by mouth 2 (two) times daily.   Discontinued Medications    IMIPRAMINE (TOFRANIL) 25 MG TABLET    imipramine 25 mg tablet    LACTOBAC NO.41/BIFIDOBACT NO.7 (PROBIOTIC-10 ORAL)    Probiotic    LOSARTAN (COZAAR) 50 MG TABLET    Take 50 mg by mouth once daily.    OMEPRAZOLE (PRILOSEC) 40 MG CAPSULE    Take 40 mg by mouth once daily.    OMEPRAZOLE 20 MG TBLD    omeprazole 20 mg capsule,delayed release    PRAVASTATIN (PRAVACHOL) 40 MG TABLET    pravastatin 40 mg tablet   Take 1 tablet every day by oral route.    TOPIRAMATE (TOPAMAX) 100 MG TABLET    Topamax 100 mg tablet   Take 1 tablet twice a day by oral route.         Vital Signs:  /84   Pulse (!) 53   Ht 5' 3" (1.6 m)   Wt 100.3 kg (221 lb 3.2 oz)   SpO2 97%   BMI 39.18 kg/m²        Physical Exam  Vitals reviewed.   Constitutional:       General: She is awake. She is not in acute distress.     Appearance: Normal appearance. She is well-developed. She is not ill-appearing, " toxic-appearing or diaphoretic.   HENT:      Head: Normocephalic and atraumatic.      Nose: Nose normal.      Mouth/Throat:      Mouth: Mucous membranes are moist.      Pharynx: Oropharynx is clear. No oropharyngeal exudate or posterior oropharyngeal erythema.   Eyes:      General: Lids are normal. Gaze aligned appropriately. No scleral icterus.        Right eye: No discharge.         Left eye: No discharge.      Conjunctiva/sclera: Conjunctivae normal.   Neck:      Trachea: Trachea normal.   Cardiovascular:      Rate and Rhythm: Normal rate and regular rhythm.      Pulses:           Radial pulses are 2+ on the right side and 2+ on the left side.   Pulmonary:      Effort: Pulmonary effort is normal. No respiratory distress.      Breath sounds: No stridor. No wheezing.   Chest:      Chest wall: No tenderness.   Abdominal:      General: Bowel sounds are normal. There is no distension.      Palpations: Abdomen is soft. There is no fluid wave, hepatomegaly or mass.      Tenderness: There is no abdominal tenderness. There is no guarding or rebound.   Musculoskeletal:         General: No tenderness or deformity.      Cervical back: No tenderness.      Right lower leg: No edema.      Left lower leg: No edema.   Lymphadenopathy:      Cervical: No cervical adenopathy.   Skin:     General: Skin is warm and dry.      Capillary Refill: Capillary refill takes less than 2 seconds.      Coloration: Skin is not cyanotic, jaundiced or pale.   Neurological:      General: No focal deficit present.      Mental Status: She is alert and oriented to person, place, and time.      Motor: No tremor.   Psychiatric:         Attention and Perception: Attention normal.         Mood and Affect: Mood and affect normal.         Speech: Speech normal.         Behavior: Behavior normal. Behavior is cooperative.            All of the data above and below has been reviewed by myself and any further interpretations will be reflected in the assessment  and plan.   The data includes review of external notes, and independent interpretation of lab results, procedures, x-rays, and imaging reports.      Assessment:  Gastroesophageal reflux disease, unspecified whether esophagitis present    Elevated LFTs  -     Comprehensive Metabolic Panel; Future; Expected date: 02/10/2025    Diverticulosis    Severe obesity (BMI 35.0-39.9) with comorbidity    History of colon polyps  -     Ambulatory Referral to External Surgery  -     polyethylene glycol (GOLYTELY) 236-22.74-6.74 -5.86 gram suspension; Take 4,000 mLs (4 L total) by mouth once. for 1 dose  Dispense: 4000 mL; Refill: 0      GERD- on omeprazole 40mg daily. Filled by PCP.  Elevated LFTs- mild since 2022. Liver US was normal. Will repeat. Consider liver workup if continues to elevate  Constipation- resolved since chole. Discussed MiraLax titration if needed.  Hx colon polyps- due for colonoscopy.    Recommendations:    Schedule colonoscopy with Dr. Marcano.  Submit blood test.  Begin taking MiraLAX. Start with 1/2 capful daily, and titrate dose up or down until you are having daily, soft bowel movements.    If any tests, procedures, or imaging has been ordered and you are not contacted to schedule within 1-2 weeks, then you may call the central scheduling department directly at [phone].     Risks, benefits, and alternatives of medical management, any associated procedures, and/or treatment discussed with the patient. Patient given opportunity to ask questions and voices understanding. Patient has elected to proceed with the recommended care modalities as discussed.    Follow up if symptoms worsen or fail to improve.    Order summary:  Orders Placed This Encounter   Procedures    Comprehensive Metabolic Panel    Ambulatory Referral to External Surgery          Instructed patient to notify my office if they have not been contacted within two weeks after any procedures, submitting any samples (biopsies, blood, stool,  urine, etc.) or after any imaging (X-ray, CT, MRI, etc.).      Kate Gao NP    This document may have been created using a voice recognition transcribing system. Incorrect words or phrases may have been missed during proofreading. Please interpret accordingly or contact me for clarification.

## 2025-02-10 ENCOUNTER — OFFICE VISIT (OUTPATIENT)
Dept: GASTROENTEROLOGY | Facility: CLINIC | Age: 75
End: 2025-02-10
Payer: MEDICARE

## 2025-02-10 VITALS
WEIGHT: 221.19 LBS | HEART RATE: 53 BPM | HEIGHT: 63 IN | DIASTOLIC BLOOD PRESSURE: 84 MMHG | OXYGEN SATURATION: 97 % | BODY MASS INDEX: 39.19 KG/M2 | SYSTOLIC BLOOD PRESSURE: 130 MMHG

## 2025-02-10 DIAGNOSIS — R79.89 ELEVATED LFTS: ICD-10-CM

## 2025-02-10 DIAGNOSIS — E66.01 SEVERE OBESITY (BMI 35.0-39.9) WITH COMORBIDITY: ICD-10-CM

## 2025-02-10 DIAGNOSIS — K57.90 DIVERTICULOSIS: ICD-10-CM

## 2025-02-10 DIAGNOSIS — Z86.0100 HISTORY OF COLON POLYPS: ICD-10-CM

## 2025-02-10 DIAGNOSIS — K21.9 GASTROESOPHAGEAL REFLUX DISEASE, UNSPECIFIED WHETHER ESOPHAGITIS PRESENT: Primary | ICD-10-CM

## 2025-02-10 PROCEDURE — 99204 OFFICE O/P NEW MOD 45 MIN: CPT | Mod: S$PBB,,, | Performed by: NURSE PRACTITIONER

## 2025-02-10 RX ORDER — OMEPRAZOLE 40 MG/1
CAPSULE, DELAYED RELEASE ORAL
COMMUNITY

## 2025-02-10 RX ORDER — POLYETHYLENE GLYCOL 3350, SODIUM SULFATE ANHYDROUS, SODIUM BICARBONATE, SODIUM CHLORIDE, POTASSIUM CHLORIDE 236; 22.74; 6.74; 5.86; 2.97 G/4L; G/4L; G/4L; G/4L; G/4L
4 POWDER, FOR SOLUTION ORAL ONCE
Qty: 4000 ML | Refills: 0 | Status: SHIPPED | OUTPATIENT
Start: 2025-02-10 | End: 2025-02-10

## 2025-02-10 RX ORDER — ACETAMINOPHEN 500 MG
500 TABLET ORAL NIGHTLY
COMMUNITY

## 2025-02-10 NOTE — PATIENT INSTRUCTIONS
Schedule colonoscopy with Dr. Marcano.  Submit blood test.  Begin taking MiraLAX. Start with 1/2 capful daily, and titrate dose up or down until you are having daily, soft bowel movements.    If any tests, procedures, or imaging has been ordered and you are not contacted to schedule within 1-2 weeks, then you may call the central scheduling department directly at [phone].   Please notify my office if you have not been contacted within two weeks after any procedures, submitting any samples (biopsies, blood, stool, urine, etc.) or after any imaging (X-ray, CT, MRI, etc.).

## 2025-02-14 ENCOUNTER — TELEPHONE (OUTPATIENT)
Dept: GASTROENTEROLOGY | Facility: CLINIC | Age: 75
End: 2025-02-14
Payer: MEDICARE

## 2025-02-14 DIAGNOSIS — E87.5 HYPERKALEMIA: Primary | ICD-10-CM

## 2025-02-14 LAB
ALBUMIN SERPL-MCNC: 4.4 G/DL (ref 3.5–5.2)
ALBUMIN/GLOB SERPL ELPH: 1.4 {RATIO} (ref 1–2.7)
ALP ISOS SERPL LEV INH-CCNC: 75 U/L (ref 35–105)
ALT (SGPT): 21 U/L (ref 0–33)
ANION GAP SERPL CALC-SCNC: 10 MMOL/L (ref 8–17)
AST SERPL-CCNC: 33 U/L (ref 0–32)
BILIRUBIN, TOTAL: 0.3 MG/DL (ref 0–1.2)
BUN/CREAT SERPL: 17.7 (ref 6–20)
CALCIUM SERPL-MCNC: 10.3 MG/DL (ref 8.6–10.2)
CARBON DIOXIDE, CO2: 25 MMOL/L (ref 22–29)
CHLORIDE: 107 MMOL/L (ref 98–107)
CREAT SERPL-MCNC: 0.96 MG/DL (ref 0.5–0.9)
GFR ESTIMATION: 61.32 ML/MIN/1.73M2
GLOBULIN: 3.1 G/DL (ref 1.5–4.5)
GLUCOSE: 92 MG/DL (ref 82–115)
POTASSIUM: 5.2 MMOL/L (ref 3.5–5.1)
PROT SNV-MCNC: 7.5 G/DL (ref 6.4–8.3)
SODIUM: 142 MMOL/L (ref 136–145)
UREA NITROGEN (BUN): 17 MG/DL (ref 8–23)

## 2025-02-14 NOTE — TELEPHONE ENCOUNTER
2/14/2025 Cr 0.96H, AST 33H, Ca 10.3H, K 5.2H    Call with results. Her liver enzymes have improved since her hospital visit. One is still mildly elevated (1 point above normal). Recommend repeat liver enzymes in 6 months.  Reminder set. Please set another reminder as back up.  KN

## 2025-02-14 NOTE — TELEPHONE ENCOUNTER
----- Message from Rosibel sent at 2/14/2025  2:31 PM CST -----  Contact: TINA PRETTY [[id number]]  .Type:  Patient Returning Call    Who Called:TINA PRETTY [[id number]]  Who Left Message for Patient:Janet Garber LPN  Does the patient know what this is regarding?:results   Would the patient rather a call back or a response via [facility]? call  Best Call Back Number:.[phone] (home)     Additional Information:

## 2025-02-17 NOTE — TELEPHONE ENCOUNTER
Her potassium level was 0.1 higher than normal limits so likely benign finding. I know she has h/o hyperkalemia. I can order a repeat blood test to check her potassium again to make sure it decreased.  Order signed.  KN

## 2025-02-18 NOTE — TELEPHONE ENCOUNTER
Pt notified and will go to [facility] to have lab drawn...   LDN  Lab routed via [email or web address] to [facility].. CARLENEN

## 2025-02-21 ENCOUNTER — RESULTS FOLLOW-UP (OUTPATIENT)
Dept: GASTROENTEROLOGY | Facility: CLINIC | Age: 75
End: 2025-02-21
Payer: MEDICARE

## 2025-02-21 LAB
ANION GAP SERPL CALC-SCNC: 11 MMOL/L (ref 8–17)
BUN/CREAT SERPL: 21.2 (ref 6–20)
CALCIUM SERPL-MCNC: 9.7 MG/DL (ref 8.6–10.2)
CARBON DIOXIDE, CO2: 24 MMOL/L (ref 22–29)
CHLORIDE: 110 MMOL/L (ref 98–107)
CREAT SERPL-MCNC: 0.91 MG/DL (ref 0.5–0.9)
GFR ESTIMATION: 65.38 ML/MIN/1.73M2
GLUCOSE: 100 MG/DL (ref 82–115)
POTASSIUM: 4.8 MMOL/L (ref 3.5–5.1)
SODIUM: 145 MMOL/L (ref 136–145)
UREA NITROGEN (BUN): 19.3 MG/DL (ref 8–23)

## 2025-07-01 ENCOUNTER — TELEPHONE (OUTPATIENT)
Dept: GASTROENTEROLOGY | Facility: CLINIC | Age: 75
End: 2025-07-01
Payer: MEDICARE

## 2025-07-01 DIAGNOSIS — Z86.0100 HISTORY OF COLON POLYPS: Primary | ICD-10-CM

## 2025-07-01 NOTE — TELEPHONE ENCOUNTER
Called and left a detailed message that I was calling as a courtesy regarding up coming Colon with NBP on 7/9/25, Wed and wanted to verify that she has her paper prep instructions and meds. I also mentioned that [facility] will call the day before (TUES) with the arrival time, GI Lab is located on the third floor, and to pre-register before next Tuesday. pushpa

## 2025-07-01 NOTE — TELEPHONE ENCOUNTER
"  [facility]      Provider: Cassandra Marcano MD    Patient Name: Daisy DELGADO (age):1950  75 y.o.           Gender: female   Phone: [phone]     Referring Physician: Tommie Porras     Vital Signs:   Height - 5' 3"  Weight - 221 lb    Plan: Colonoscopy w/ adult colonoscope     Encounter Diagnosis   Name Primary?    History of colon polyps Yes         History:      Past Medical History:   Diagnosis Date    COVID 2022    Diverticulosis     Epilepsy     Hypercholesterolemia     Hypertension     Incontinence in female       Past Surgical History:   Procedure Laterality Date    CHOLECYSTECTOMY  2024    EYE SURGERY      FOOT SURGERY      HYSTERECTOMY      TONSILLECTOMY      TOTAL REPLACEMENT OF BOTH KNEES USING COMPUTER-ASSISTED NAVIGATION        Medication List with Changes/Refills   Current Medications    ACETAMINOPHEN (TYLENOL) 500 MG TABLET    Take 500 mg by mouth every evening.    ASPIRIN 81 MG CHEW    Aspir-81    CALCIUM CARB AND CITRATE-VITD3 (CITRACAL + D SLOW RELEASE) 600 MG CALCIUM- 500 UNIT TBSR    Citracal + D Slow Release 600 mg calcium-500 unit tablet,ext.release   Take by oral route.    FLUTICASONE PROPIONATE (FLONASE) 50 MCG/ACTUATION NASAL SPRAY    1 spray by Each Nostril route once daily.    KRILL-OM-3-DHA-EPA-PHOSPHO-AST (MEGARED OMEGA-3 KRILL OIL) [id number] MG CAP    MegaRed Omega-3 Krill Oil 500 mg-115 mg-30 mg-64 mg capsule   Take by oral route.    LORATADINE (CLARITIN) 10 MG TABLET    Claritin    LOSARTAN (COZAAR) 100 MG TABLET    Take 100 mg by mouth once daily.    MULTIVITAMIN (THERAGRAN) TABLET    Take 1 tablet by mouth once daily.    OMEPRAZOLE (PRILOSEC) 40 MG CAPSULE        PRAVASTATIN (PRAVACHOL) 40 MG TABLET    Take 40 mg by mouth every evening.    TOPIRAMATE (TOPAMAX) 100 MG TABLET    Take 100 mg by mouth 2 (two) times daily.      Review of patient's allergies indicates:  No Known " Allergies   Family History   Problem Relation Name Age of Onset    Lung cancer Mother      Prostate cancer Father      Breast cancer Sister      Lung cancer Brother      Stomach cancer Neg Hx      Rectal cancer Neg Hx      Liver cancer Neg Hx      Liver disease Neg Hx      Colon cancer Neg Hx      Celiac disease Neg Hx      Crohn's disease Neg Hx        Social History[1]        [1]   Social History  Tobacco Use    Smoking status: Never    Smokeless tobacco: Never   Substance Use Topics    Alcohol use: Never    Drug use: Never

## 2025-07-08 LAB — CRC RECOMMENDATION EXT: NORMAL

## 2025-07-09 ENCOUNTER — OUTSIDE PLACE OF SERVICE (OUTPATIENT)
Dept: GASTROENTEROLOGY | Facility: CLINIC | Age: 75
End: 2025-07-09

## 2025-07-13 ENCOUNTER — RESULTS FOLLOW-UP (OUTPATIENT)
Dept: GASTROENTEROLOGY | Facility: CLINIC | Age: 75
End: 2025-07-13
Payer: MEDICARE

## 2025-07-13 DIAGNOSIS — R10.12 LUQ ABDOMINAL PAIN: ICD-10-CM

## 2025-07-13 DIAGNOSIS — R79.89 ELEVATED LFTS: ICD-10-CM

## 2025-07-13 DIAGNOSIS — R10.13 EPIGASTRIC PAIN: Primary | ICD-10-CM

## 2025-07-13 NOTE — TELEPHONE ENCOUNTER
2 lymph, repeat colonoscopy with adult colonoscope in 5 years. Epi/LUQ pain did not improved with colonoscopy prep.    Notify patient that her colon polyps were benign. Repeat colonoscopy in 5 years. Confirm recall tab in appointment desk is up-to-date (update if needed). Given her persistent pain, rec MRI of the pancreas. Order signed.  NBP

## 2025-07-15 NOTE — TELEPHONE ENCOUNTER
Patient was notified of results and voiced understanding. Recall tab is up-to-date. Patient agreed to MRI of pancreas. Order faxed to central scheduling. No PA required. DMP

## 2025-08-06 ENCOUNTER — RESULTS FOLLOW-UP (OUTPATIENT)
Dept: GASTROENTEROLOGY | Facility: CLINIC | Age: 75
End: 2025-08-06
Payer: MEDICARE

## 2025-08-06 NOTE — TELEPHONE ENCOUNTER
8/5/2025 LFTs-NL    Call with results. Her liver enzymes are normal. Has she been scheduled for her MRI?  Kn

## 2025-08-06 NOTE — TELEPHONE ENCOUNTER
S/W pt informed her that her Liver Enzymes were normal. Pt is scheduled for her MRI August 13th 2025.- MARTINEZ

## 2025-08-17 ENCOUNTER — TELEPHONE (OUTPATIENT)
Dept: GASTROENTEROLOGY | Facility: CLINIC | Age: 75
End: 2025-08-17
Payer: MEDICARE

## 2025-08-20 ENCOUNTER — OFFICE VISIT (OUTPATIENT)
Dept: UROLOGY | Facility: CLINIC | Age: 75
End: 2025-08-20
Payer: MEDICARE

## 2025-08-20 VITALS
WEIGHT: 218 LBS | DIASTOLIC BLOOD PRESSURE: 80 MMHG | BODY MASS INDEX: 38.62 KG/M2 | HEART RATE: 46 BPM | SYSTOLIC BLOOD PRESSURE: 130 MMHG | OXYGEN SATURATION: 96 % | HEIGHT: 63 IN

## 2025-08-20 DIAGNOSIS — N28.9 RENAL LESION: Primary | ICD-10-CM

## 2025-08-20 LAB — POC RESIDUAL URINE VOLUME: 17 ML (ref 0–100)

## 2025-08-20 PROCEDURE — 99214 OFFICE O/P EST MOD 30 MIN: CPT | Mod: S$PBB,,, | Performed by: FAMILY MEDICINE

## 2025-08-20 RX ORDER — DOCUSATE SODIUM 100 MG/1
100 CAPSULE, LIQUID FILLED ORAL 2 TIMES DAILY
COMMUNITY

## 2025-08-20 RX ORDER — IBUPROFEN 100 MG/5ML
1000 SUSPENSION, ORAL (FINAL DOSE FORM) ORAL DAILY
COMMUNITY

## 2025-08-22 ENCOUNTER — TELEPHONE (OUTPATIENT)
Dept: UROLOGY | Facility: CLINIC | Age: 75
End: 2025-08-22
Payer: MEDICARE

## 2025-08-29 ENCOUNTER — DOCUMENTATION ONLY (OUTPATIENT)
Dept: GASTROENTEROLOGY | Facility: CLINIC | Age: 75
End: 2025-08-29
Payer: MEDICARE